# Patient Record
Sex: FEMALE | Race: WHITE | Employment: FULL TIME | URBAN - METROPOLITAN AREA
[De-identification: names, ages, dates, MRNs, and addresses within clinical notes are randomized per-mention and may not be internally consistent; named-entity substitution may affect disease eponyms.]

---

## 2023-09-18 ENCOUNTER — HOSPITAL ENCOUNTER (EMERGENCY)
Facility: HOSPITAL | Age: 48
Discharge: HOME | End: 2023-09-18
Attending: EMERGENCY MEDICINE
Payer: MEDICAID

## 2023-09-18 ENCOUNTER — APPOINTMENT (OUTPATIENT)
Dept: CT IMAGING | Facility: HOSPITAL | Age: 48
End: 2023-09-18
Attending: EMERGENCY MEDICINE
Payer: MEDICAID

## 2023-09-18 ENCOUNTER — APPOINTMENT (OUTPATIENT)
Dept: ULTRASOUND IMAGING | Facility: HOSPITAL | Age: 48
End: 2023-09-18
Attending: EMERGENCY MEDICINE
Payer: MEDICAID

## 2023-09-18 VITALS
RESPIRATION RATE: 18 BRPM | OXYGEN SATURATION: 100 % | HEIGHT: 60 IN | WEIGHT: 167 LBS | HEART RATE: 61 BPM | BODY MASS INDEX: 32.79 KG/M2 | DIASTOLIC BLOOD PRESSURE: 63 MMHG | SYSTOLIC BLOOD PRESSURE: 111 MMHG | TEMPERATURE: 98 F

## 2023-09-18 DIAGNOSIS — R10.9 ABDOMINAL PAIN OF UNKNOWN ETIOLOGY: Primary | ICD-10-CM

## 2023-09-18 LAB
ALBUMIN SERPL-MCNC: 3.4 G/DL (ref 3.4–5)
ALBUMIN/GLOB SERPL: 0.9 {RATIO} (ref 1–2)
ALP LIVER SERPL-CCNC: 99 U/L
ALT SERPL-CCNC: 60 U/L
ANION GAP SERPL CALC-SCNC: 5 MMOL/L (ref 0–18)
AST SERPL-CCNC: 27 U/L (ref 15–37)
B-HCG UR QL: NEGATIVE
BASOPHILS # BLD AUTO: 0.05 X10(3) UL (ref 0–0.2)
BASOPHILS NFR BLD AUTO: 0.4 %
BILIRUB SERPL-MCNC: 0.2 MG/DL (ref 0.1–2)
BILIRUB UR QL: NEGATIVE
BUN BLD-MCNC: 9 MG/DL (ref 7–18)
BUN/CREAT SERPL: 15.8 (ref 10–20)
CALCIUM BLD-MCNC: 8.8 MG/DL (ref 8.5–10.1)
CHLORIDE SERPL-SCNC: 111 MMOL/L (ref 98–112)
CO2 SERPL-SCNC: 24 MMOL/L (ref 21–32)
CREAT BLD-MCNC: 0.57 MG/DL
DEPRECATED RDW RBC AUTO: 44.8 FL (ref 35.1–46.3)
EGFRCR SERPLBLD CKD-EPI 2021: 113 ML/MIN/1.73M2 (ref 60–?)
EOSINOPHIL # BLD AUTO: 0.13 X10(3) UL (ref 0–0.7)
EOSINOPHIL NFR BLD AUTO: 1.1 %
ERYTHROCYTE [DISTWIDTH] IN BLOOD BY AUTOMATED COUNT: 13.2 % (ref 11–15)
GLOBULIN PLAS-MCNC: 3.9 G/DL (ref 2.8–4.4)
GLUCOSE BLD-MCNC: 103 MG/DL (ref 70–99)
GLUCOSE UR-MCNC: NORMAL MG/DL
HCT VFR BLD AUTO: 44.1 %
HGB BLD-MCNC: 14.6 G/DL
IMM GRANULOCYTES # BLD AUTO: 0.05 X10(3) UL (ref 0–1)
IMM GRANULOCYTES NFR BLD: 0.4 %
KETONES UR-MCNC: NEGATIVE MG/DL
LEUKOCYTE ESTERASE UR QL STRIP.AUTO: 25
LIPASE SERPL-CCNC: 28 U/L (ref 13–75)
LYMPHOCYTES # BLD AUTO: 2.3 X10(3) UL (ref 1–4)
LYMPHOCYTES NFR BLD AUTO: 19 %
MCH RBC QN AUTO: 30.4 PG (ref 26–34)
MCHC RBC AUTO-ENTMCNC: 33.1 G/DL (ref 31–37)
MCV RBC AUTO: 91.7 FL
MONOCYTES # BLD AUTO: 0.68 X10(3) UL (ref 0.1–1)
MONOCYTES NFR BLD AUTO: 5.6 %
NEUTROPHILS # BLD AUTO: 8.92 X10 (3) UL (ref 1.5–7.7)
NEUTROPHILS # BLD AUTO: 8.92 X10(3) UL (ref 1.5–7.7)
NEUTROPHILS NFR BLD AUTO: 73.5 %
NITRITE UR QL STRIP.AUTO: NEGATIVE
OSMOLALITY SERPL CALC.SUM OF ELEC: 289 MOSM/KG (ref 275–295)
PH UR: 5.5 [PH] (ref 5–8)
PLATELET # BLD AUTO: 400 10(3)UL (ref 150–450)
POTASSIUM SERPL-SCNC: 4.3 MMOL/L (ref 3.5–5.1)
PROT SERPL-MCNC: 7.3 G/DL (ref 6.4–8.2)
PROT UR-MCNC: 20 MG/DL
RBC # BLD AUTO: 4.81 X10(6)UL
SODIUM SERPL-SCNC: 140 MMOL/L (ref 136–145)
SP GR UR STRIP: 1.01 (ref 1–1.03)
UROBILINOGEN UR STRIP-ACNC: NORMAL
WBC # BLD AUTO: 12.1 X10(3) UL (ref 4–11)

## 2023-09-18 PROCEDURE — 83690 ASSAY OF LIPASE: CPT | Performed by: EMERGENCY MEDICINE

## 2023-09-18 PROCEDURE — 99285 EMERGENCY DEPT VISIT HI MDM: CPT

## 2023-09-18 PROCEDURE — 87077 CULTURE AEROBIC IDENTIFY: CPT | Performed by: EMERGENCY MEDICINE

## 2023-09-18 PROCEDURE — 87086 URINE CULTURE/COLONY COUNT: CPT | Performed by: EMERGENCY MEDICINE

## 2023-09-18 PROCEDURE — 81001 URINALYSIS AUTO W/SCOPE: CPT | Performed by: EMERGENCY MEDICINE

## 2023-09-18 PROCEDURE — 81025 URINE PREGNANCY TEST: CPT

## 2023-09-18 PROCEDURE — 96375 TX/PRO/DX INJ NEW DRUG ADDON: CPT

## 2023-09-18 PROCEDURE — 76705 ECHO EXAM OF ABDOMEN: CPT | Performed by: EMERGENCY MEDICINE

## 2023-09-18 PROCEDURE — 96374 THER/PROPH/DIAG INJ IV PUSH: CPT

## 2023-09-18 PROCEDURE — 80053 COMPREHEN METABOLIC PANEL: CPT | Performed by: EMERGENCY MEDICINE

## 2023-09-18 PROCEDURE — S0028 INJECTION, FAMOTIDINE, 20 MG: HCPCS | Performed by: EMERGENCY MEDICINE

## 2023-09-18 PROCEDURE — 85025 COMPLETE CBC W/AUTO DIFF WBC: CPT | Performed by: EMERGENCY MEDICINE

## 2023-09-18 PROCEDURE — 74177 CT ABD & PELVIS W/CONTRAST: CPT | Performed by: EMERGENCY MEDICINE

## 2023-09-18 PROCEDURE — 99284 EMERGENCY DEPT VISIT MOD MDM: CPT

## 2023-09-18 RX ORDER — CEPHALEXIN 500 MG/1
500 CAPSULE ORAL 2 TIMES DAILY
Qty: 14 CAPSULE | Refills: 0 | Status: SHIPPED | OUTPATIENT
Start: 2023-09-18 | End: 2023-09-25

## 2023-09-18 RX ORDER — SUCRALFATE ORAL 1 G/10ML
1 SUSPENSION ORAL
Qty: 1200 ML | Refills: 0 | Status: SHIPPED | OUTPATIENT
Start: 2023-09-18 | End: 2023-10-18

## 2023-09-18 RX ORDER — DICYCLOMINE HYDROCHLORIDE 10 MG/1
10 CAPSULE ORAL ONCE
Status: COMPLETED | OUTPATIENT
Start: 2023-09-18 | End: 2023-09-18

## 2023-09-18 RX ORDER — PANTOPRAZOLE SODIUM 40 MG/1
40 TABLET, DELAYED RELEASE ORAL DAILY
Qty: 30 TABLET | Refills: 0 | Status: SHIPPED | OUTPATIENT
Start: 2023-09-18 | End: 2023-10-18

## 2023-09-18 RX ORDER — FAMOTIDINE 10 MG/ML
20 INJECTION, SOLUTION INTRAVENOUS ONCE
Status: COMPLETED | OUTPATIENT
Start: 2023-09-18 | End: 2023-09-18

## 2023-09-18 RX ORDER — ONDANSETRON 2 MG/ML
4 INJECTION INTRAMUSCULAR; INTRAVENOUS ONCE
Status: COMPLETED | OUTPATIENT
Start: 2023-09-18 | End: 2023-09-18

## 2023-09-18 NOTE — ED INITIAL ASSESSMENT (HPI)
PT reports abdominal pain and lower back pain since yesterday. Pt report 7/10.  Pt reports no fever/ N/V.

## 2023-09-20 NOTE — H&P
[address] [facility]                                                                                                               Reason for consult: abd pain    Requesting physician or provider: No primary care provider on file. Patient presents with: Follow - Up: From  ER  visit/ consult for  abdominal pain and constipation      HPI:   Estelita Morton is a 52year old year-old female without significant medical history. she is here today for ED follow up  -pt evaluated in ED on 9/18/23 for 3 weeks of epigastric pain that was more severe that day  -since the ED pt reports feeling a little better, pain currently mild  -pain is worse after eating about 2-3 hours after meal, will feel the pain for about 1 hour  -pain is sometimes better after a bowel movement   -ED labs notable for WBC 12.1 with elevated neutrophils  -unremarkable CMP & lipase, CT unremarkable for acute finding, US gallbladder normal  -pt discharged with cephalexin (for suspected UTI, but urine culture results advise no treatment, pt notified she can stop taking cephalexin, pt denies any urinary symptoms), pantoprazole and sucralfate  -pt reports feeling constipated, last bowel movement today but small amount of hard stool and feeling of incomplete evacuation     Patient denies symptoms of vomiting, dysphagia, odynophagia, globus sensation, heartburn, hematemesis, hematochezia, or melena. she denies recent change in appetite, fever or unintentional weight loss.         Last colonoscopy: none, pt scheduled October 6th for first colonoscopy for [facility] at [address] [place] [address],[address] EGD: none     NSAIDS:none   Tobacco:none   Alcohol:none   Marijuana:none   Illicit drugs:none     FH GI malignancy: none     No history of adverse reaction to sedation  No AISLINN  No anticoagulants  No pacemaker/defibrillator  No pain medications and/or sleep aides    Wt Readings from Last 6 Encounters:  09/21/23 : 168 lb (76.2 kg)  09/18/23 : 167 lb (75.8 kg)       History, Medications, Allergies, ROS:      History reviewed. No pertinent past medical history. History reviewed. No pertinent surgical history. Family Hx: History reviewed. No pertinent family history. Social History:   Social History     Socioeconomic History    Marital status: Single   Tobacco Use    Smoking status: Never    Smokeless tobacco: Never        Medications (Active prior to today's visit):  Current Outpatient Medications   Medication Sig Dispense Refill    Omeprazole 40 MG Oral Capsule Delayed Release Take 1 capsule (40 mg total) by mouth daily. Take 1 capsule by mouth daily before breakfast. 90 capsule 0    Alum & Mag Hydroxide-Simeth 400-400-40 MG/5ML Oral Suspension Take 15 mL by mouth every 6 (six) hours as needed for Indigestion. 1 each 0    pantoprazole 40 MG Oral Tab EC Take 1 tablet (40 mg total) by mouth daily. 30 tablet 0    sucralfate 1 GM/10ML Oral Suspension Take 10 mL (1 g total) by mouth 4 (four) times daily before meals and nightly. 1200 mL 0    cephalexin 500 MG Oral Cap Take 1 capsule (500 mg total) by mouth 2 (two) times daily for 7 days. 14 capsule 0       Allergies:  No Known Allergies    ROS:   CONSTITUTIONAL: negative for fevers, chills, sweats and weight loss  EYES Negative for scleral icterus or redness, and diplopia  HEENT: Negative for dysphagia and hoarseness  RESPIRATORY: Negative for cough and severe shortness of breath  CARDIOVASCULAR: Negative for crushing sub-sternal chest pain  GASTROINTESTINAL: See HPI  GENITOURINARY: Negative for dysuria  MUSCULOSKELETAL: Negative for arthralgias and myalgias  SKIN: Negative for jaundice, rash or pruritus  NEUROLOGICAL: Negative for dizziness and headaches  BEHAVIOR/PSYCH: Negative for psychotic behavior and poor appetite    PHYSICAL EXAM:   Blood pressure 115/78, pulse 81, height 5' (1.524 m), weight 168 lb (76.2 kg).     GEN: Alert, no acute distress, well-nourished   HEENT: anicteric sclera, neck supple, trachea midline, MMM, no palpable or tender neck or supraclavicular lymph nodes  CV: RRR, the extremities are warm and well perfused   LUNGS: No increased work of breathing, CTAB  ABDOMEN: +generalized tenderness throughout, most over epigastric. Soft, symmetrical, without distention or guarding. No scars or lesions. Aorta is without bruit or visible pulsation. Umbilicus is midline without herniation. Normoactive bowel sounds are present, No masses, hepatomegaly or splenomegaly noted. MSK: No erythema, no warmth, no swelling of joints  SKIN: No jaundice, no erythema, no rashes, no lesions  HEMATOLOGIC: No bleeding, no bruising  NEURO: Alert and interactive, GARRETT  PSYCH: appropriate mood & affect    Labs/Imaging/Procedures:     Patient's pertinent labs and imaging were reviewed and discussed with patient today. .  ASSESSMENT/PLAN:   Sarah Leone is a 52year old year-old female without significant medical history.     she is here today for ED follow up  -pt evaluated in ED on 9/18/23 for 3 weeks of epigastric pain that was more severe that day  -since the ED pt reports feeling a little better, pain currently mild  -pain is worse after eating about 2-3 hours after meal, will feel the pain for about 1 hour  -pain is sometimes better after a bowel movement   -ED labs notable for WBC 12.1 with elevated neutrophils  -unremarkable CMP & lipase, CT unremarkable for acute finding, US gallbladder normal  -pt discharged with cephalexin (for suspected UTI, but urine culture results advise no treatment, pt notified she can stop taking cephalexin, pt denies any urinary symptoms), pantoprazole and sucralfate    #epigastric abd pain  -reports pain is currently mild  -generalized tenderness over entire abdomen, worse over epigastric abdomen   -Differentials include but not limited to: PUD, GERD, gastritis, functional dyspepsia, food sensitivity/intolerance (lactose)    #constipation  -pt reports feeling constipated, last bowel movement today but small amount of hard stool and feeling of incomplete evacuation     Recommendations:  make follow up appt with me in 2 months    For abdominal pain:  -avoid greasy, fatty, spicy meals and avoid alcohol    -keep taking pantoprazole prescribed from the ED x 8 weeks   **I prescribed a different proton pump inhibitor (omeprazole) that you can  when you run out of pantoprazole since the pantoprazole was expensive. You can check the website [employer] to see which location it is cheapest to buy it at. It is over the counter too so you don't need the prescription if you go to a different pharmacy.   -take omeprazole 40mg daily (30 minute before meal) x 8 weeks  ---THEN take every other day for 2 weeks  ---THEN take 2x weekly for 2 weeks  ---THEN you can stop taking    -You can KEEP taking sucralfate 4x daily if needed    -can take maalox 3-4x daily if needed for pain    -stay hydrated, drink plenty of water    For constipation recommendations:  -begin fiber supplement daily or increase dietary fiber  -eat prunes or prune juice daily  -can try taking over the counter probiotic  -increase water intake & physical activity    -will schedule patient for EGD if symptoms worsen or don't improve    Pt kindly declined , daughter Jessi Lopez with patient interpreting    Orders This Visit:  Orders Placed This Encounter      Helicobacter Pylori Breath Test, Adult      Meds This Visit:  Requested Prescriptions     Signed Prescriptions Disp Refills    Omeprazole 40 MG Oral Capsule Delayed Release 90 capsule 0     Sig: Take 1 capsule (40 mg total) by mouth daily. Take 1 capsule by mouth daily before breakfast.    Alum & Mag Hydroxide-Simeth 400-400-40 MG/5ML Oral Suspension 1 each 0     Sig: Take 15 mL by mouth every 6 (six) hours as needed for Indigestion.        Imaging & Referrals:  None      Inell IVANA Oconnell [facility], [facility]  9/20/2023        This note was partially prepared using [employer] voice recognition dictation software. As a result, errors may occur. When identified, these errors have been corrected.  While every attempt is made to correct errors during dictation, discrepancies may still exist.

## 2023-09-21 ENCOUNTER — LAB ENCOUNTER (OUTPATIENT)
Dept: LAB | Facility: HOSPITAL | Age: 48
End: 2023-09-21
Payer: MEDICAID

## 2023-09-21 ENCOUNTER — OFFICE VISIT (OUTPATIENT)
Facility: CLINIC | Age: 48
End: 2023-09-21

## 2023-09-21 VITALS
HEIGHT: 60 IN | DIASTOLIC BLOOD PRESSURE: 78 MMHG | BODY MASS INDEX: 32.98 KG/M2 | WEIGHT: 168 LBS | SYSTOLIC BLOOD PRESSURE: 115 MMHG | HEART RATE: 81 BPM

## 2023-09-21 DIAGNOSIS — R10.13 EPIGASTRIC ABDOMINAL PAIN: Primary | ICD-10-CM

## 2023-09-21 DIAGNOSIS — R10.13 EPIGASTRIC ABDOMINAL PAIN: ICD-10-CM

## 2023-09-21 PROCEDURE — 83013 H PYLORI (C-13) BREATH: CPT

## 2023-09-21 RX ORDER — ALUMINA, MAGNESIA, AND SIMETHICONE 2400; 2400; 240 MG/30ML; MG/30ML; MG/30ML
15 SUSPENSION ORAL EVERY 6 HOURS PRN
Qty: 1 EACH | Refills: 0 | Status: SHIPPED | OUTPATIENT
Start: 2023-09-21

## 2023-09-21 RX ORDER — OMEPRAZOLE 40 MG/1
40 CAPSULE, DELAYED RELEASE ORAL DAILY
Qty: 90 CAPSULE | Refills: 0 | Status: SHIPPED | OUTPATIENT
Start: 2023-09-21 | End: 2023-12-20

## 2023-09-21 NOTE — PATIENT INSTRUCTIONS
-make follow up appt with me in 2 months    For abdominal pain:  -avoid greasy, fatty, spicy meals and avoid alcohol    -keep taking pantoprazole prescribed from the ED x 8 weeks   **I prescribed a different proton pump inhibitor (omeprazole) that you can  when you run out of pantoprazole since the pantoprazole was expensive. You can check the website [employer] to see which location it is cheapest to buy it at.  It is over the counter too so you don't need the prescription if you go to a different pharmacy.   -take omeprazole 40mg daily (30 minute before meal) x 8 weeks  ---THEN take every other day for 2 weeks  ---THEN take 2x weekly for 2 weeks  ---THEN you can stop taking    -You can KEEP taking sucralfate 4x daily if needed    -can take maalox 3-4x daily if needed for pain    -stay hydrated, drink plenty of water      For constipation recommendations:  -begin fiber supplement daily or increase dietary fiber  -eat prunes or prune juice daily  -can try taking over the counter probiotic  -increase water intake & physical activity

## 2023-09-23 LAB — H PYLORI BREATH TEST: NEGATIVE

## 2023-12-22 ENCOUNTER — TELEPHONE (OUTPATIENT)
Facility: CLINIC | Age: 48
End: 2023-12-22